# Patient Record
Sex: MALE | URBAN - METROPOLITAN AREA
[De-identification: names, ages, dates, MRNs, and addresses within clinical notes are randomized per-mention and may not be internally consistent; named-entity substitution may affect disease eponyms.]

---

## 2019-09-17 ENCOUNTER — OFFICE VISIT (OUTPATIENT)
Dept: URBAN - METROPOLITAN AREA CLINIC 32 | Facility: CLINIC | Age: 44
End: 2019-09-17
Payer: COMMERCIAL

## 2019-09-17 DIAGNOSIS — H53.19 SUBJECTIVE VISUAL DISTURBANCES: Primary | ICD-10-CM

## 2019-09-17 PROCEDURE — 92133 CPTRZD OPH DX IMG PST SGM ON: CPT | Performed by: OPTOMETRIST

## 2019-09-17 PROCEDURE — 76514 ECHO EXAM OF EYE THICKNESS: CPT | Performed by: OPTOMETRIST

## 2019-09-17 PROCEDURE — 99204 OFFICE O/P NEW MOD 45 MIN: CPT | Performed by: OPTOMETRIST

## 2019-09-17 ASSESSMENT — KERATOMETRY
OS: 42.63
OD: 43.00

## 2019-09-17 ASSESSMENT — INTRAOCULAR PRESSURE
OS: 13
OD: 13

## 2019-09-17 NOTE — IMPRESSION/PLAN
Impression: Subjective Visual Disturbances: H53.19.  Plan: concerned about family history of glaucoma, OCT and [place] [place] 75 show no signs of it

## 2019-11-05 ENCOUNTER — OFFICE VISIT (OUTPATIENT)
Dept: URBAN - METROPOLITAN AREA CLINIC 32 | Facility: CLINIC | Age: 44
End: 2019-11-05
Payer: COMMERCIAL

## 2019-11-05 DIAGNOSIS — H10.413 CONJUNCTIVITIS - ALLERGIC: Primary | ICD-10-CM

## 2019-11-05 PROCEDURE — 99213 OFFICE O/P EST LOW 20 MIN: CPT | Performed by: OPTOMETRIST

## 2019-11-05 RX ORDER — DUREZOL 0.5 MG/ML
0.05 % EMULSION OPHTHALMIC
Qty: 5 | Refills: 1 | Status: INACTIVE
Start: 2019-11-05 | End: 2019-11-05

## 2019-11-05 RX ORDER — BEPOTASTINE BESILATE 15 MG/ML
1.5 % SOLUTION/ DROPS OPHTHALMIC
Qty: 5 | Refills: 3 | Status: INACTIVE
Start: 2019-11-05 | End: 2019-11-05

## 2019-11-05 RX ORDER — BEPOTASTINE BESILATE 15 MG/ML
1.5 % SOLUTION/ DROPS OPHTHALMIC
Qty: 5 | Refills: 3 | Status: INACTIVE
Start: 2019-11-05 | End: 2019-11-06

## 2019-11-05 RX ORDER — DUREZOL 0.5 MG/ML
0.05 % EMULSION OPHTHALMIC
Qty: 5 | Refills: 1 | Status: INACTIVE
Start: 2019-11-05 | End: 2019-11-06

## 2019-11-05 ASSESSMENT — INTRAOCULAR PRESSURE
OS: 10
OD: 11

## 2019-11-05 NOTE — IMPRESSION/PLAN
Impression: Conjunctivitis - Allergic: H10.413.  Plan: durezol BID OU Bepreve BID OU AT PRN RTC 1 month

## 2019-12-10 ENCOUNTER — OFFICE VISIT (OUTPATIENT)
Dept: URBAN - METROPOLITAN AREA CLINIC 32 | Facility: CLINIC | Age: 44
End: 2019-12-10
Payer: COMMERCIAL

## 2019-12-10 PROCEDURE — 99213 OFFICE O/P EST LOW 20 MIN: CPT | Performed by: OPTOMETRIST

## 2019-12-10 RX ORDER — DOXYCYCLINE HYCLATE 50 MG/1
50 MG TABLET, FILM COATED ORAL
Qty: 30 | Refills: 1 | Status: INACTIVE
Start: 2019-12-10 | End: 2020-11-06

## 2019-12-10 RX ORDER — OLOPATADINE HYDROCHLORIDE 1 MG/ML
0.1 % SOLUTION/ DROPS OPHTHALMIC
Qty: 5 | Refills: 3 | Status: INACTIVE
Start: 2019-12-10 | End: 2020-04-10

## 2019-12-10 ASSESSMENT — INTRAOCULAR PRESSURE
OD: 15
OS: 14

## 2019-12-10 NOTE — IMPRESSION/PLAN
Impression: Conjunctivitis - Allergic: H10.413.  Plan: durezol taper, cont Bepreve BID OU, start doxy PO

## 2020-11-06 ENCOUNTER — OFFICE VISIT (OUTPATIENT)
Dept: URBAN - METROPOLITAN AREA CLINIC 32 | Facility: CLINIC | Age: 45
End: 2020-11-06
Payer: COMMERCIAL

## 2020-11-06 PROCEDURE — 92012 INTRM OPH EXAM EST PATIENT: CPT | Performed by: OPTOMETRIST

## 2020-11-06 RX ORDER — OLOPATADINE HYDROCHLORIDE 1 MG/ML
0.1 % SOLUTION/ DROPS OPHTHALMIC
Qty: 5 | Refills: 2 | Status: INACTIVE
Start: 2020-11-06 | End: 2021-01-26

## 2020-11-06 ASSESSMENT — KERATOMETRY
OD: 43.13
OS: 42.75

## 2020-11-06 ASSESSMENT — INTRAOCULAR PRESSURE
OD: 13
OS: 14

## 2020-11-06 ASSESSMENT — VISUAL ACUITY
OD: 20/20
OS: 20/20

## 2022-08-04 ENCOUNTER — OFFICE VISIT (OUTPATIENT)
Dept: URBAN - METROPOLITAN AREA CLINIC 32 | Facility: CLINIC | Age: 47
End: 2022-08-04
Payer: COMMERCIAL

## 2022-08-04 DIAGNOSIS — R73.03 PREDIABETES: Primary | ICD-10-CM

## 2022-08-04 PROCEDURE — 99214 OFFICE O/P EST MOD 30 MIN: CPT | Performed by: OPTOMETRIST

## 2022-08-04 ASSESSMENT — VISUAL ACUITY
OD: 20/20
OS: 20/20

## 2022-08-04 ASSESSMENT — KERATOMETRY
OS: 42.50
OD: 42.75

## 2022-08-04 NOTE — IMPRESSION/PLAN
Impression: Prediabetes: R73.03. Plan: Prediabetes: no background retinopathy, no signs of neovascularization noted. Discussed ocular and systemic benefits of blood sugar control. MAC OCT preformed and reviewed with patient.  Return to clinic with Dr. Ramiro Strauss in one year for Diabetic eye exam and MAC OCT